# Patient Record
Sex: FEMALE | Race: WHITE | Employment: PART TIME | URBAN - METROPOLITAN AREA
[De-identification: names, ages, dates, MRNs, and addresses within clinical notes are randomized per-mention and may not be internally consistent; named-entity substitution may affect disease eponyms.]

---

## 2019-03-02 NOTE — PRE-PROCEDURE
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     Planned Procedure:  1. Right temporomandibular joint arthroscopy with lysis and lavage.     Planned Anesthesia: General via nasal endotracheal tube    Attending Surgeon:  Dr. Bello ANDRE     Resident Surgeon:  Dr. Gayathri ANDRE     Resident Assistants:  Dr. Raymon ANDRE.     Consent: Will be obtained day of procedure.     Last clinic encounter (12/10/18):    [facility] Progress Note    Interval Note: Bonnie is a 69 y/o woman who is referred from Dr. Pearl of the [facility] with history of right TMJ arthralgia at rest and with function.  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 Per patient, the arthralgia has been progressively worsening over the last 14 months despite physical therapy and wearing an occlusal splint. Patient reports to have a dull, aching pain of the right TMJ with a severity of 3/10 (with reports of discomfort up to 7/10 per progress notes) that occurs when clenching, yawning, and when eating chewy/crunchy foods. Reports symptoms of bilateral clicking and a history of closed lock on the right 1.5 years ago, this resolved spontaneously and has not occured again. The patient is unable to identify a specific time associated with the onset of these symptoms.   At her last visit on 11/9/18 SHAHZAD 32mm with deviation to right on opening, protrusive 8mm, right lateral 10mm, left lateral 12mm. Noted nontender popping of the left TMJ. No crepitus or popping noted at the right TMJ.    At last appointment, 11/9/18, surgical vs nonsurgical treatment  was discussed with the patient.  She expressed she would like nonsurgical treatment.  Recommended to patient NSAID therapy Ibuprofen 600mg q6h scheduled along with diet modification to minimize symptoms.  Patient presents today for re-evaluation of symptomatology associated with the right TMJ.    O:  HEENT: normocephalic, no facial swelliing. No CLAD.  SHAHZAD is 34mm with deviation to the right.  Popping of the left TMJ, no popping/clicking/crepitus of the right TMJ, it appears upon rotational component of opening she is never able to reduce the disc on the right inhibiting translation which could be reason for deviation to the right.  I/O: OP clear, uvula midline, fair oral hygiene, intact adult dentition. No vestibular edema, floor of mouth soft and not elevated. No erythema, ulcerations, pigmentations, swellings, or exophytic lesions present. SHAHZAD 34mm with noted deviation to right on opening, protrusive 8mm, right lateral 10mm, left lateral 12mm.     Imaging: None new    A:  Bonnie Elliott is a 69 yo woman, ASA 2, with history of right TMJ arthralgia and right anterior disc displacement without reduction    P:  - Dr. Monsalve evaluated patient and all available imaging  - Discussed with patient and her , Randell, risk of TMJ arthroscopy including bleeding, perforation into external auditory canal, perforation in middle cranial fossa, paresthesia of branches of V3, damage to CN VII, inability to eliminate pain  - Discussed benefits of TMJ arthroscopy.  Discussed that this procedure is diagnostic as well as therapeutic given that the scope allows visualization of the superior joint space which can identify fibrillations, inflammatory markings, and adhesions.  In addition arthroscopy will act to increase the volume of the joint space in the effort to promote increased mobility of the right TMJ post-operatively.  In addition will place steroid and opioid into joint space for relief.  - Will need order for PT  post-operatively  - Questions answered    NV: Right TMJ arthroscopy with Dr. Bello Trinidad DDS    Staff: Dr. Bello Henderson DDS   Oral & Maxillofacial Surgery  [phone]

## 2019-03-04 RX ORDER — FLUOXETINE 10 MG/1
10 CAPSULE ORAL DAILY
COMMUNITY

## 2019-03-04 RX ORDER — SODIUM PHOSPHATE,MONO-DIBASIC 19G-7G/118
1 ENEMA (ML) RECTAL DAILY
COMMUNITY

## 2019-03-04 RX ORDER — FAMOTIDINE 20 MG
TABLET ORAL
COMMUNITY

## 2019-03-04 RX ORDER — ASPIRIN 81 MG/1
81 TABLET, CHEWABLE ORAL DAILY
COMMUNITY

## 2019-03-04 SDOH — HEALTH STABILITY: MENTAL HEALTH: HOW OFTEN DO YOU HAVE A DRINK CONTAINING ALCOHOL?: NEVER

## 2019-03-05 ENCOUNTER — ANESTHESIA EVENT (OUTPATIENT)
Dept: SURGERY | Facility: CLINIC | Age: 69
End: 2019-03-05
Payer: COMMERCIAL

## 2019-03-06 ENCOUNTER — ANESTHESIA (OUTPATIENT)
Dept: SURGERY | Facility: CLINIC | Age: 69
End: 2019-03-06
Payer: COMMERCIAL

## 2019-03-06 ENCOUNTER — HOSPITAL ENCOUNTER (OUTPATIENT)
Facility: CLINIC | Age: 69
Discharge: HOME | End: 2019-03-06
Attending: ORAL & MAXILLOFACIAL SURGERY | Admitting: ORAL & MAXILLOFACIAL SURGERY
Payer: COMMERCIAL

## 2019-03-06 VITALS
OXYGEN SATURATION: 99 % | HEART RATE: 70 BPM | HEIGHT: 67 IN | RESPIRATION RATE: 18 BRPM | SYSTOLIC BLOOD PRESSURE: 130 MMHG | TEMPERATURE: 97.9 F | DIASTOLIC BLOOD PRESSURE: 69 MMHG | BODY MASS INDEX: 22.39 KG/M2 | WEIGHT: 142.64 LBS

## 2019-03-06 DIAGNOSIS — M26.621 ARTHRALGIA OF RIGHT TEMPOROMANDIBULAR JOINT: Primary | ICD-10-CM

## 2019-03-06 LAB — GLUCOSE BLDC GLUCOMTR-MCNC: 82 MG/DL (ref 70–99)

## 2019-03-06 PROCEDURE — 71000014 ZZH RECOVERY PHASE 1 LEVEL 2 FIRST HR: Performed by: ORAL & MAXILLOFACIAL SURGERY

## 2019-03-06 PROCEDURE — 82962 GLUCOSE BLOOD TEST: CPT

## 2019-03-06 PROCEDURE — 36000057 ZZH SURGERY LEVEL 3 1ST 30 MIN - UMMC: Performed by: ORAL & MAXILLOFACIAL SURGERY

## 2019-03-06 PROCEDURE — 25000132 ZZH RX MED GY IP 250 OP 250 PS 637: Performed by: DENTIST

## 2019-03-06 PROCEDURE — 25000125 ZZHC RX 250: Performed by: NURSE ANESTHETIST, CERTIFIED REGISTERED

## 2019-03-06 PROCEDURE — 36000059 ZZH SURGERY LEVEL 3 EA 15 ADDTL MIN UMMC: Performed by: ORAL & MAXILLOFACIAL SURGERY

## 2019-03-06 PROCEDURE — 25000128 H RX IP 250 OP 636: Performed by: DENTIST

## 2019-03-06 PROCEDURE — 37000008 ZZH ANESTHESIA TECHNICAL FEE, 1ST 30 MIN: Performed by: ORAL & MAXILLOFACIAL SURGERY

## 2019-03-06 PROCEDURE — 27210794 ZZH OR GENERAL SUPPLY STERILE: Performed by: ORAL & MAXILLOFACIAL SURGERY

## 2019-03-06 PROCEDURE — 40000170 ZZH STATISTIC PRE-PROCEDURE ASSESSMENT II: Performed by: ORAL & MAXILLOFACIAL SURGERY

## 2019-03-06 PROCEDURE — 25000565 ZZH ISOFLURANE, EA 15 MIN: Performed by: ORAL & MAXILLOFACIAL SURGERY

## 2019-03-06 PROCEDURE — 25800030 ZZH RX IP 258 OP 636: Performed by: NURSE ANESTHETIST, CERTIFIED REGISTERED

## 2019-03-06 PROCEDURE — 25000128 H RX IP 250 OP 636: Performed by: NURSE ANESTHETIST, CERTIFIED REGISTERED

## 2019-03-06 PROCEDURE — 25000128 H RX IP 250 OP 636: Performed by: ORAL & MAXILLOFACIAL SURGERY

## 2019-03-06 PROCEDURE — 71000027 ZZH RECOVERY PHASE 2 EACH 15 MINS: Performed by: ORAL & MAXILLOFACIAL SURGERY

## 2019-03-06 PROCEDURE — 25800025 ZZH RX 258: Performed by: ORAL & MAXILLOFACIAL SURGERY

## 2019-03-06 PROCEDURE — 37000009 ZZH ANESTHESIA TECHNICAL FEE, EACH ADDTL 15 MIN: Performed by: ORAL & MAXILLOFACIAL SURGERY

## 2019-03-06 RX ORDER — BUPIVACAINE HYDROCHLORIDE 2.5 MG/ML
INJECTION, SOLUTION INFILTRATION; PERINEURAL PRN
Status: DISCONTINUED | OUTPATIENT
Start: 2019-03-06 | End: 2019-03-06 | Stop reason: HOSPADM

## 2019-03-06 RX ORDER — DEXAMETHASONE SODIUM PHOSPHATE 10 MG/ML
INJECTION, SOLUTION INTRAMUSCULAR; INTRAVENOUS PRN
Status: DISCONTINUED | OUTPATIENT
Start: 2019-03-06 | End: 2019-03-06 | Stop reason: HOSPADM

## 2019-03-06 RX ORDER — KETOROLAC TROMETHAMINE 30 MG/ML
INJECTION, SOLUTION INTRAMUSCULAR; INTRAVENOUS PRN
Status: DISCONTINUED | OUTPATIENT
Start: 2019-03-06 | End: 2019-03-06

## 2019-03-06 RX ORDER — PROPOFOL 10 MG/ML
INJECTION, EMULSION INTRAVENOUS PRN
Status: DISCONTINUED | OUTPATIENT
Start: 2019-03-06 | End: 2019-03-06

## 2019-03-06 RX ORDER — NALOXONE HYDROCHLORIDE 0.4 MG/ML
.1-.4 INJECTION, SOLUTION INTRAMUSCULAR; INTRAVENOUS; SUBCUTANEOUS
Status: DISCONTINUED | OUTPATIENT
Start: 2019-03-06 | End: 2019-03-06 | Stop reason: HOSPADM

## 2019-03-06 RX ORDER — CEFAZOLIN SODIUM 1 G/3ML
1 INJECTION, POWDER, FOR SOLUTION INTRAMUSCULAR; INTRAVENOUS SEE ADMIN INSTRUCTIONS
Status: DISCONTINUED | OUTPATIENT
Start: 2019-03-06 | End: 2019-03-06 | Stop reason: HOSPADM

## 2019-03-06 RX ORDER — ONDANSETRON 2 MG/ML
4 INJECTION INTRAMUSCULAR; INTRAVENOUS EVERY 30 MIN PRN
Status: DISCONTINUED | OUTPATIENT
Start: 2019-03-06 | End: 2019-03-06 | Stop reason: HOSPADM

## 2019-03-06 RX ORDER — FENTANYL CITRATE 50 UG/ML
INJECTION, SOLUTION INTRAMUSCULAR; INTRAVENOUS PRN
Status: DISCONTINUED | OUTPATIENT
Start: 2019-03-06 | End: 2019-03-06 | Stop reason: HOSPADM

## 2019-03-06 RX ORDER — MEPERIDINE HYDROCHLORIDE 50 MG/ML
12.5 INJECTION INTRAMUSCULAR; INTRAVENOUS; SUBCUTANEOUS
Status: DISCONTINUED | OUTPATIENT
Start: 2019-03-06 | End: 2019-03-06 | Stop reason: HOSPADM

## 2019-03-06 RX ORDER — CEFAZOLIN SODIUM 2 G/100ML
2 INJECTION, SOLUTION INTRAVENOUS
Status: COMPLETED | OUTPATIENT
Start: 2019-03-06 | End: 2019-03-06

## 2019-03-06 RX ORDER — FENTANYL CITRATE 50 UG/ML
25-50 INJECTION, SOLUTION INTRAMUSCULAR; INTRAVENOUS
Status: DISCONTINUED | OUTPATIENT
Start: 2019-03-06 | End: 2019-03-06 | Stop reason: HOSPADM

## 2019-03-06 RX ORDER — ACETAMINOPHEN 325 MG/1
650 TABLET ORAL EVERY 4 HOURS PRN
Status: DISCONTINUED | OUTPATIENT
Start: 2019-03-06 | End: 2019-03-06 | Stop reason: HOSPADM

## 2019-03-06 RX ORDER — ACETAMINOPHEN 325 MG/1
650 TABLET ORAL EVERY 4 HOURS PRN
Qty: 50 TABLET | Refills: 0 | Status: SHIPPED | OUTPATIENT
Start: 2019-03-06

## 2019-03-06 RX ORDER — SODIUM CHLORIDE, SODIUM LACTATE, POTASSIUM CHLORIDE, CALCIUM CHLORIDE 600; 310; 30; 20 MG/100ML; MG/100ML; MG/100ML; MG/100ML
INJECTION, SOLUTION INTRAVENOUS CONTINUOUS
Status: DISCONTINUED | OUTPATIENT
Start: 2019-03-06 | End: 2019-03-06 | Stop reason: HOSPADM

## 2019-03-06 RX ORDER — ONDANSETRON 4 MG/1
4 TABLET, ORALLY DISINTEGRATING ORAL EVERY 30 MIN PRN
Status: DISCONTINUED | OUTPATIENT
Start: 2019-03-06 | End: 2019-03-06 | Stop reason: HOSPADM

## 2019-03-06 RX ORDER — FENTANYL CITRATE 50 UG/ML
INJECTION, SOLUTION INTRAMUSCULAR; INTRAVENOUS PRN
Status: DISCONTINUED | OUTPATIENT
Start: 2019-03-06 | End: 2019-03-06

## 2019-03-06 RX ORDER — CHLORHEXIDINE GLUCONATE ORAL RINSE 1.2 MG/ML
10 SOLUTION DENTAL ONCE
Status: COMPLETED | OUTPATIENT
Start: 2019-03-06 | End: 2019-03-06

## 2019-03-06 RX ORDER — DIMENHYDRINATE 50 MG/ML
25 INJECTION, SOLUTION INTRAMUSCULAR; INTRAVENOUS
Status: DISCONTINUED | OUTPATIENT
Start: 2019-03-06 | End: 2019-03-06 | Stop reason: HOSPADM

## 2019-03-06 RX ORDER — HYDROCODONE BITARTRATE AND ACETAMINOPHEN 5; 325 MG/1; MG/1
1-2 TABLET ORAL EVERY 4 HOURS PRN
Qty: 8 TABLET | Refills: 0 | Status: SHIPPED | OUTPATIENT
Start: 2019-03-06 | End: 2019-03-09

## 2019-03-06 RX ORDER — LIDOCAINE HYDROCHLORIDE 20 MG/ML
INJECTION, SOLUTION INFILTRATION; PERINEURAL PRN
Status: DISCONTINUED | OUTPATIENT
Start: 2019-03-06 | End: 2019-03-06

## 2019-03-06 RX ORDER — SODIUM CHLORIDE, SODIUM LACTATE, POTASSIUM CHLORIDE, CALCIUM CHLORIDE 600; 310; 30; 20 MG/100ML; MG/100ML; MG/100ML; MG/100ML
INJECTION, SOLUTION INTRAVENOUS CONTINUOUS PRN
Status: DISCONTINUED | OUTPATIENT
Start: 2019-03-06 | End: 2019-03-06

## 2019-03-06 RX ORDER — FENTANYL CITRATE 50 UG/ML
50 INJECTION, SOLUTION INTRAMUSCULAR; INTRAVENOUS ONCE
Status: DISCONTINUED | OUTPATIENT
Start: 2019-03-06 | End: 2019-03-06 | Stop reason: HOSPADM

## 2019-03-06 RX ORDER — IBUPROFEN 600 MG/1
600 TABLET, FILM COATED ORAL EVERY 6 HOURS PRN
Qty: 20 TABLET | Refills: 1 | Status: SHIPPED | OUTPATIENT
Start: 2019-03-06 | End: 2019-03-11

## 2019-03-06 RX ORDER — ONDANSETRON 2 MG/ML
INJECTION INTRAMUSCULAR; INTRAVENOUS PRN
Status: DISCONTINUED | OUTPATIENT
Start: 2019-03-06 | End: 2019-03-06

## 2019-03-06 RX ORDER — DEXAMETHASONE SODIUM PHOSPHATE 4 MG/ML
INJECTION, SOLUTION INTRA-ARTICULAR; INTRALESIONAL; INTRAMUSCULAR; INTRAVENOUS; SOFT TISSUE PRN
Status: DISCONTINUED | OUTPATIENT
Start: 2019-03-06 | End: 2019-03-06

## 2019-03-06 RX ADMIN — FENTANYL CITRATE 50 MCG: 50 INJECTION, SOLUTION INTRAMUSCULAR; INTRAVENOUS at 08:28

## 2019-03-06 RX ADMIN — CHLORHEXIDINE GLUCONATE 10 ML: 1.2 RINSE ORAL at 07:13

## 2019-03-06 RX ADMIN — ONDANSETRON 4 MG: 2 INJECTION INTRAMUSCULAR; INTRAVENOUS at 08:00

## 2019-03-06 RX ADMIN — FENTANYL CITRATE 50 MCG: 50 INJECTION, SOLUTION INTRAMUSCULAR; INTRAVENOUS at 08:00

## 2019-03-06 RX ADMIN — DEXAMETHASONE SODIUM PHOSPHATE 6 MG: 4 INJECTION, SOLUTION INTRA-ARTICULAR; INTRALESIONAL; INTRAMUSCULAR; INTRAVENOUS; SOFT TISSUE at 08:00

## 2019-03-06 RX ADMIN — CEFAZOLIN SODIUM 2 G: 2 INJECTION, SOLUTION INTRAVENOUS at 08:00

## 2019-03-06 RX ADMIN — MIDAZOLAM 1 MG: 1 INJECTION INTRAMUSCULAR; INTRAVENOUS at 07:30

## 2019-03-06 RX ADMIN — ROCURONIUM BROMIDE 35 MG: 10 INJECTION INTRAVENOUS at 07:45

## 2019-03-06 RX ADMIN — FENTANYL CITRATE 50 MCG: 50 INJECTION, SOLUTION INTRAMUSCULAR; INTRAVENOUS at 08:08

## 2019-03-06 RX ADMIN — MIDAZOLAM 1 MG: 1 INJECTION INTRAMUSCULAR; INTRAVENOUS at 07:39

## 2019-03-06 RX ADMIN — PROPOFOL 140 MG: 10 INJECTION, EMULSION INTRAVENOUS at 07:45

## 2019-03-06 RX ADMIN — SODIUM CHLORIDE, POTASSIUM CHLORIDE, SODIUM LACTATE AND CALCIUM CHLORIDE: 600; 310; 30; 20 INJECTION, SOLUTION INTRAVENOUS at 07:39

## 2019-03-06 RX ADMIN — FENTANYL CITRATE 50 MCG: 50 INJECTION, SOLUTION INTRAMUSCULAR; INTRAVENOUS at 07:45

## 2019-03-06 RX ADMIN — KETOROLAC TROMETHAMINE 30 MG: 30 INJECTION, SOLUTION INTRAMUSCULAR at 08:49

## 2019-03-06 RX ADMIN — SODIUM CHLORIDE, POTASSIUM CHLORIDE, SODIUM LACTATE AND CALCIUM CHLORIDE: 600; 310; 30; 20 INJECTION, SOLUTION INTRAVENOUS at 09:04

## 2019-03-06 RX ADMIN — SUGAMMADEX 200 MG: 100 INJECTION, SOLUTION INTRAVENOUS at 08:55

## 2019-03-06 RX ADMIN — ROCURONIUM BROMIDE 15 MG: 10 INJECTION INTRAVENOUS at 08:28

## 2019-03-06 RX ADMIN — LIDOCAINE HYDROCHLORIDE 80 MG: 20 INJECTION, SOLUTION INFILTRATION; PERINEURAL at 07:45

## 2019-03-06 ASSESSMENT — MIFFLIN-ST. JEOR: SCORE: 1219.63

## 2019-03-06 NOTE — BRIEF OP NOTE
[facility], [place]    Brief Operative Note    Pre-operative diagnosis: Right Tempormandivular Joint Arthralgia, Disc Displacement without reduction, Osteoarthritis, Anxiety  Post-operative diagnosis Right Tempormandivular Joint Arthralgia, Disc Displacement without reduction, Osteoarthritis, Anxiety  Procedure: Procedure(s):  Right Tempormandibular Joint Arthroscopy with lysis and lavage  Surgeon: Surgeon(s) and Role:     * Rebel Monsalve DDS - Primary     * Barbara Trinh DDS - Resident - Assisting     * Gerald Weinberg MD - Resident - Assisting  Anesthesia: General   Estimated blood loss: 2 ml  Fluids: 1000 ml LR  Anesthetic. 2 ml 0.5% Marcaine no epinephrine  Drains: None  Specimens: * No specimens in log *  Findings:   extensive fibrillations in right TMJ.  Complications: None.  Implants: None.

## 2019-03-06 NOTE — OR NURSING
Oral surgery resident LAXMI REINA - stated pt did not need to urinate before discharge home.   Discharge instructions to pt and responsible adult.  All questions regarding discharge information answered.  Pt and responsible adult verbalized understanding of all discharge instruction information given.

## 2019-03-06 NOTE — ANESTHESIA POSTPROCEDURE EVALUATION
Anesthesia POST Procedure Evaluation    Patient: Bonnie Elliott   MRN:     [id number] Gender:   female   Age:    68 year old :      1950        Preoperative Diagnosis: Right Tempormandivular Joint Arthralgia, Disc Displacement without reduction, Osteoarthritis, Anxiety   Procedure(s):  Right Tempormandibular Joint Arthroscopy with lysis and lavage   Postop Comments: No value filed.       Anesthesia Type:  General    Reportable Event: NO     PAIN: Uncomplicated   Sign Out status: Comfortable, Well controlled pain     PONV: No PONV   Sign Out status:  No Nausea or Vomiting     Neuro/Psych: Uneventful perioperative course   Sign Out Status: Preoperative baseline; Age appropriate mentation     Airway/Resp.: Uneventful perioperative course   Sign Out Status: Non labored breathing, age appropriate RR; Resp. Status within EXPECTED Parameters     CV: Uneventful perioperative course   Sign Out status: Appropriate BP and perfusion indices; Appropriate HR/Rhythm     Disposition:   Sign Out in:  PACU  Disposition:  Phase II; Home  Recovery Course: Uneventful  Follow-Up: Not required           Last Anesthesia Record Vitals:  CRNA VITALS  3/6/2019 0833 - 3/6/2019 0933      3/6/2019             Pulse:  99    SpO2:  100 %    Resp Rate (observed):  3  (Abnormal)           Last PACU/Preop Vitals:  Vitals:    19 0945 19 1000 19 1018   BP: 132/67 122/67 134/68   Pulse:  63 64   Resp: 12 12 12   Temp:  36.4  C (97.5  F) 36.4  C (97.5  F)   SpO2: 100% 100% 97%         Electronically Signed By: Guadalupe Castellon MD, 2019, 10:41 AM

## 2019-03-06 NOTE — ANESTHESIA PREPROCEDURE EVALUATION
Anesthesia Pre-Procedure Evaluation    Patient: Bonnie Elliott   MRN:     [id number] Gender:   female   Age:    68 year old :      1950        Preoperative Diagnosis: Right Tempormandivular Joint Arthralgia, Disc Displacement without reduction, Osteoarthritis, Anxiety   Procedure(s):  Right Tempormandibular Joint Arthroscopy **Latex Allergy**     Past Medical History:   Diagnosis Date     Anemia      Anxious depression      Endometriosis       Past Surgical History:   Procedure Laterality Date     AS REMOVAL OF OVARY(S)       COLONOSCOPY       GYN SURGERY            Anesthesia Evaluation     . Pt has had prior anesthetic. Type: General and MAC           ROS/MED HX    ENT/Pulmonary:  - neg pulmonary ROS     Neurologic:  - neg neurologic ROS     Cardiovascular:  - neg cardiovascular ROS       METS/Exercise Tolerance:  >4 METS   Hematologic:         Musculoskeletal:   (+) , , other musculoskeletal (TMJ right associated with feeling of sore throat and right neck discomfort.  Pt can open mouth, but force of chewing is painful)-       GI/Hepatic:  - neg GI/hepatic ROS       Renal/Genitourinary:  - ROS Renal section negative       Endo:  - neg endo ROS       Psychiatric:     (+) psychiatric history anxiety      Infectious Disease:  - neg infectious disease ROS       Malignancy:      - no malignancy   Other:                         PHYSICAL EXAM:   Mental Status/Neuro: A/A/O   Airway: Facies: Feasible  Mallampati: IV  Mouth/Opening: Limited  TM distance: < 6 cm  Neck ROM: Full   Respiratory: Auscultation: CTAB     Resp. Rate: Normal     Resp. Effort: Normal      CV: Rhythm: Regular  Rate: Age appropriate  Heart: Normal Sounds   Comments:      Dental: Normal                  No results found for: WBC, HGB, HCT, PLT, CRP, SED, NA, POTASSIUM, CHLORIDE, CO2, BUN, CR, GLC, RACHAEL, PHOS, MAG, ALBUMIN, PROTTOTAL, ALT, AST, GGT, ALKPHOS, BILITOTAL, BILIDIRECT, LIPASE, AMYLASE, KATHY, PTT, INR, FIBR, TSH, T4, T3, HCG,  "HCGS, CKTOTAL, CKMB, TROPN    Preop Vitals  BP Readings from Last 3 Encounters:   03/06/19 131/70    Pulse Readings from Last 3 Encounters:   03/06/19 78      Resp Readings from Last 3 Encounters:   03/06/19 16    SpO2 Readings from Last 3 Encounters:   03/06/19 99%      Temp Readings from Last 1 Encounters:   03/06/19 36.7  C (98.1  F) (Oral)    Ht Readings from Last 1 Encounters:   03/06/19 1.702 m (5' 7\")      Wt Readings from Last 1 Encounters:   03/06/19 64.7 kg (142 lb 10.2 oz)    Estimated body mass index is 22.34 kg/m  as calculated from the following:    Height as of this encounter: 1.702 m (5' 7\").    Weight as of this encounter: 64.7 kg (142 lb 10.2 oz).     LDA:            Assessment:   ASA SCORE: 2    NPO Status: > 2 hours since completed Clear Liquids; > 6 hours since completed Solid Foods   Documentation: H&P complete; Preop Testing complete; Consents complete   Proceeding: Proceed without further delay  Tobacco Use:  NO Active use of Tobacco/UNKNOWN Tobacco use status     Plan:   Anes. Type:  General   Pre-Induction: Midazolam IV; Acetaminophen PO   Induction:  IV (Standard)   Airway: Oral ETT   Access/Monitoring: PIV   Maintenance: Balanced   Emergence: Procedure Site   Logistics: Same Day Surgery     Postop Pain/Sedation Strategy:  Standard-Options: Opioids PRN     PONV Management:  Adult Risk Factors: Female, Non-Smoker, Postop Opioids  Prevention: Ondansetron; Dexamethasone     CONSENT: Direct conversation   Plan and risks discussed with: Patient; Sibling   Blood Products: Consent Deferred (Minimal Blood Loss)                         Guadalupe Castellon MD  "

## 2019-03-06 NOTE — DISCHARGE INSTRUCTIONS
What can I expect after surgery?    Following surgery, you will spend a brief period of time in the recovery room. Once your have recovered from the anesthesia, you will be discharged with the appropriate prescriptions and home care instructions below. You must arrive with a responsible adult who will drive you home and care for you after the surgery.    Swelling is normal after surgery and may take 5-7 days to improve.    You may experience a temporary change in your bite. If so, this will  usually resolve in 5-7 days.    You may notice clicking in your jaw joint that was not present before.  This may be normal and is not a cause for concern.    You may have numbness and possible temporary paralysis of the face on  the side of the surgery. This is usually caused by local anesthetic (numbing medication) administered for your comfort and should only last for a few hours.  What are my post-operative instructions?    Apply ice packs to the affected side of the face for the first 12-24 hours.    While in bed, keep your head elevated with 2-3 pillows to minimize  swelling.    Resorbable sutures (aka. Stitches) are placed over your incisions. These  stitches will dissolve so there is no need to remove any stitches.    You may shower anytime. To dry, lightly pat your face with a towel  instead of rubbing it.    After the first 48 hours, you may apply warm heat (ie. heating pads or  microwave a wet cloth) to the jaw muscles and temples as needed for  Comfort.    What should my activity level be?    Avoid any strenuous physical activity for 1 week.    You may return to school or light duty work (non-physical labor) within a  week of the surgery or as tolerated per your symptoms.    What should my activity level be?  Begin jaw-stretching exercises as soon as possible after surgery:  1.Manual jaw stretch: Pry the upper and lower teeth apart using your index finger on the lower teeth and your thumb on the upper teeth. Scissor  or spread the teeth apart to stretch your jaw until you feel some tightness and hold it for 2-3 seconds. Repeat this maneuver 20 times in the morning, afternoon, and evening, for a total of 60 times daily.  2. It may help to apply warm heat over your jaw muscles before and after these stretching exercises.  3. Since these jaw-stretching exercises are likely to cause some discomfort, you may take analgesics (pain killers) one hour prior.    Are there diet restrictions after surgery?    A soft diet is recommended for 1-2 weeks after your surgery (e.g. yogurt, eggs, mashed potatoes, overcooked pasta, etc.).    Avoid hard, crunchy or chewy foods for 1-2 weeks.    Drink plenty of water and other liquids to stay well hydrated.    How do I manage pain after surgery?  Your doctors will prescribe analgesics (pain killers) based on your health history and current medications. Please take them as prescribed. The most common medications prescribed after TMJ arthroscopy include:    Anti-inflammatory medications     Muscle relaxants  Note: Our goal is to tailor the treatment to your specific needs. Your doctor may prescribe other medications as needed.    What follow-up care will I receive?  Your doctor s office will call you to schedule a follow-up appointment. Usually this appointment will take place 2-3 weeks after surgery.    When should I call my doctor?    If you have difficulty breathing    If you have increased swelling 2-3 days after surgery    If you have difficulty closing your eyes after surgery    If you are unable to urinate    If you have severe pain that is not relieved by medications    If you experience a rash, nausea, vomiting, severe headache, severe  constipation, or other unexpected reactions    If you have an oral temperature over 100.5 degrees.    If you have a question or concern that must be addressed prior to the  follow-up visit.    Who should I call if I have questions?  If  you have any questions  please feel free to call our clinic or page the oral surgery resident on call.  [phone] - Ask for the Oral Surgery Resident on Call     [facility], [place]  Same-Day Surgery   Adult Discharge Orders & Instructions     For 24 hours after surgery    1. Get plenty of rest.  A responsible adult must stay with you for at least 24 hours after you leave the hospital.   2. Do not drive or use heavy equipment.  If you have weakness or tingling, don't drive or use heavy equipment until this feeling goes away.  3. Do not drink alcohol.  4. Avoid strenuous or risky activities.  Ask for help when climbing stairs.   5. You may feel lightheaded.  IF so, sit for a few minutes before standing.  Have someone help you get up.   6. If you have nausea (feel sick to your stomach): Drink only clear liquids such as apple juice, ginger ale, broth or 7-Up.  Rest may also help.  Be sure to drink enough fluids.  Move to a regular diet as you feel able.  7. You may have a slight fever. Call the doctor if your fever is over 100 F (37.7 C) (taken under the tongue) or lasts longer than 24 hours.  8. You may have a dry mouth, a sore throat, muscle aches or trouble sleeping.  These should go away after 24 hours.  9. Do not make important or legal decisions.   Call your doctor for any of the followin.  Signs of infection (fever, growing tenderness at the surgery site, a large amount of drainage or bleeding, severe pain, foul-smelling drainage, redness, swelling).    2. It has been over 8 to 10 hours since surgery and you are still not able to urinate (pass water).    3.  Headache for over 24 hours.    4.  Numbness, tingling or weakness the day after surgery (if you had spinal anesthesia).  Use this number to contact the clinic during regular business hours only please.  To contact a doctor, call __Dr. Monsalve (Oral Surgery) @ [phone]____ or:       Use this number if the clinic is closed; this is a [facility]  answering service [phone] and ask for the resident on call for   _(Oral Surgery) @ [phone]___ (answered 24 hours a day)      Emergency Department:    [facility]: [phone]       (TTY for hearing impaired: [phone])    [facility]: [phone]       (TTY for hearing impaired: [phone])           Tips for taking pain medications  To get the best pain relief possible , remember these points:      Take pain medications as directed, before pain becomes severe      Pain medication can upset your stomach: taking it with food may help      Constipation is a common side effect of pain medication. Drink plenty of  Fluids      Eat foods high in fiber. Take a stool softener  if recommended by your doctor or  Pharmacist.        Do not drink alcohol, drive or operate machinery while taking pain medications.      Ask about other ways to control pain, such as with heat, ice or relaxation.

## 2019-03-06 NOTE — ANESTHESIA CARE TRANSFER NOTE
Patient: Bonnie Elliott    Procedure(s):  Right Tempormandibular Joint Arthroscopy with lysis and lavage    Diagnosis: Right Tempormandivular Joint Arthralgia, Disc Displacement without reduction, Osteoarthritis, Anxiety  Diagnosis Additional Information: No value filed.    Anesthesia Type:   No value filed.     Note:  Airway :Face Mask  Patient transferred to:PACU  Handoff Report: Identifed the Patient, Identified the Reponsible Provider, Reviewed the pertinent medical history, Discussed the surgical course, Reviewed Intra-OP anesthesia mangement and issues during anesthesia, Set expectations for post-procedure period and Allowed opportunity for questions and acknowledgement of understanding      Vitals: (Last set prior to Anesthesia Care Transfer)    CRNA VITALS  3/6/2019 0833 - 3/6/2019 0907      3/6/2019             Pulse:  99    SpO2:  100 %    Resp Rate (observed):  3  (Abnormal)                 Electronically Signed By: JOHNSON Reyes CRNA  March 6, 2019  9:07 AM

## 2019-03-07 NOTE — OP NOTE
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 Monsalve and the Oral Maxillofacial Surgery Service for evaluation of right TMJ arthralgia and anterior disc displacement without reduction. She has pursued conservative management with Dr. Rodriguez including occlusal splint and physical therapy which did not improve her pain. Her SHAHZAD is 32mm.  Review of TMJ MRI obtained on 10/8/18  which showed right sided stage V internal derangement. She met with Dr. Monsalve who discussed continue conservative therapies vs arthroscopy with lysis and lavage. She decided to proceed with arthroscopy with lysis and lavage.       The patient elected to proceed with right TMJ arthroscopic procedure.  The risks of the procedure including pain, swelling, bleeding, infection, damage to adjacent structures including temporary or permanent paresthesia, temporary or permanent paralysis of the facial nerve, injury to the middle cranial fossa, injury to the middle ear with temporary or permanent hearing loss, or the potential for need for further procedures.  The patient verbalized understanding of the risks of the procedure and was scheduled for the procedure.      DESCRIPTION OF PROCEDURE:     The patient was met in the pre-operative holding unit at the [facility]. A head wrap was placed by the Oral Surgery Service followed by site marking of the right TMJ.  The patient was then turned over to the Anesthesia Service. Informed consent  was signed. The patient was transported via gurney to operating room #8 and once in the operating room was moved onto the operating room table and placed in the supine position.  The patient was appropriately tucked and padded. The patient then underwent a IV induction and an oral endotracheal tube was placed.  Proper positioning of the endotracheal tube was verified with positive end tidal CO2 as well as bilateral breath sounds to auscultation. The oral endotracheal tube was secured by the Anesthesia service. Brisement procedure was completed and patient had an SHAHZAD of 38 mm. There was noted to have unrestricted movement in the right TMJ. The facial prep was then performed by the Oral Surgery Service with placement of benzoin and tape surrounding the right and left ear in order to retract hair from the surgical field.  A Betadine facial scrub was then performed in order to obtain a sterile operating field.  The surgeons left the room to sterilely scrub and returned to don sterile gown and gloves.  Sterile towels and sterile drapes were then placed in order to obtain a sterile operating field.  An aperture drape was placed over the right and left ear and preauricular region in preparation for the surgical procedure.  A time out was then performed following the [facility] protocol in order to ensure proper patient and procedure.      Attention directed to the right temporomandibular joint. An 18-gauge needle on a finger controlled syringe was used to enter the superior joint space of the right temporomandibular joint in anterior-superior-medial direction. 2 mL of 0.25% Marcaine without epinephrine was injected into the superior joint space of the right temporomandibular joint for analgesia as well as for insufflation, rebound was visualized in syringe and could be palpated in the mandible. An additional 2cc of heparinized LR was used to further insufflated the superior TMJ space.   The syringe and needle were removed from the right superior joint space and the sharp trocar with the arthroscope cannula was introduced into the same puncture wound with the same orientation made by the 18-gauge needle.  The sharp trocar was used to advance the cannula of the arthroscope into the right superior joint space.  Once in the superior joint space, the sharp trocar was removed, back flow noted.  Heparinized lactated ringer's was used to lavage the superior joint space in order to remove heme and debris prior to visualization with the arthroscope.  The arthroscope was placed into the cannula. Some fibrillations were noted as well as vascular marking were noted along the articular disc and retrodiscal tissue. There was a depression in the disc but perforation of the disc could not be confirmed. Intraoperative photographs were obtained after examination of the joint space. Lavage was continued. The arthroscope was removed from the cannula and the blunt trocar was placed in the cannula for lysis of adhesions.   Additional lavage was performed with heparinized lactated Ringer's in order to remove debris from the superior joint space of the right temporomandibular joint.  480 mL of heparinized lactated Ringer's was used throughout the surgical procedure in order to lavage the superior joint space of the right temporomandibular joint.  Following completion of the lavage, 50mcg Fentanyl and 4mg of Decadron were administered into the superior joint space.  Following administration of medications, the arthroscope trocar was removed from the right temporomandibular superior joint space with fluid back pressure.  A 4x4 gauze was held with pressure over the trocar insertion site for approximately 5 minutes in order to decrease postoperative edema as well as to aid in postoperative hemostasis. The surgical site was cleaned with a moist gauze and dried. Bandaid was placed over the trochar site bilaterally. Counts were  noted to be correct x 2.      The patient was then turned over to the Anesthesia Service for a smooth emergence from anesthesia and extubation. The patient was transported by the Anesthesia Service to the Postanesthesia Care Unit for postoperative monitoring and pain management. The patient was discharged to home with postoperative instructions, medications and follow-up appointment information.  Patient has post-operative physical therapy ordered.    Dr. Monsalve was scrubbed entire procedure.       ESTIMATED BLOOD LOSS:  2 mL.      FLUIDS:  1000ml crystalloid      SPECIMENS:  None.       COMPLICATIONS:  None.       FINDINGS:  480 mL of heparinized lactated Ringer's was lavaged in the right temporomandibular joint space. Right temporomandibular joint was noted to have some fibrillations and vascular markings suggestive of inflammation.      Kalpana Trinh DDS

## (undated) DEVICE — SYR 50ML LL W/O NDL 309653

## (undated) DEVICE — NDL BLUNT 18GA 1" W/O FILTER 305181

## (undated) DEVICE — SYR 10ML LL W/O NDL 302995

## (undated) DEVICE — TUBING PRESSURE W/MALE TO FEMALE CONNECTOR 24" 50P124

## (undated) DEVICE — LABEL MEDICATION SYSTEM 3303-P

## (undated) DEVICE — LINEN TOWEL PACK X5 5464

## (undated) DEVICE — DRAPE POUCH IRR 1016

## (undated) DEVICE — DRSG JAWBRA  95

## (undated) DEVICE — TUBING PRESSURE MONITOR M/F CONNECTOR 48" 50P148

## (undated) DEVICE — CONNECTOR STOPCOCK 3 WAY MALE LL HI-FLO MX9311L

## (undated) DEVICE — PACK SET-UP STD 9102

## (undated) DEVICE — SOL RINGERS LACTATED 1000ML BAG 07953-09

## (undated) DEVICE — PREP SKIN SCRUB TRAY 4461A

## (undated) DEVICE — BRUSH SURGICAL EZ SCRUB PLAIN 371603

## (undated) DEVICE — GOWN LG DISP 9515

## (undated) DEVICE — PREP POVIDONE IODINE SCRUB 7.5% 4OZ APL82212

## (undated) DEVICE — PACK GOWN 3/PK DISP XL SBA32GPFCB

## (undated) DEVICE — DRAPE STERI APERATURE 51X33" 1030

## (undated) DEVICE — DRSG COTTON BALL 6PK LCB62

## (undated) DEVICE — ADH LIQUID MASTISOL TOPICAL VIAL 2-3ML 0523-48

## (undated) DEVICE — BLADE CLIPPER SGL USE 9680

## (undated) DEVICE — DRSG BANDAID 1X3" FABRIC CURITY LATEX FREE KC44101

## (undated) DEVICE — TUBING IV 69" STERILE 1C8160S

## (undated) DEVICE — PREP POVIDONE IODINE SOLUTION 10% 4OZ

## (undated) DEVICE — NDL COUNTER 20CT 31142493

## (undated) DEVICE — SYR 10ML FINGER CONTROL W/O NDL 309695

## (undated) DEVICE — GLOVE PROTEXIS POWDER FREE SMT 7.5  2D72PT75X

## (undated) DEVICE — DRAPE U SPLIT 74X120" 29440

## (undated) DEVICE — TAPE CLOTH 2" CARDINAL 3TRCL02

## (undated) DEVICE — LINEN TOWEL PACK X6 WHITE 5487

## (undated) DEVICE — SYR 03ML LL W/O NDL 309657

## (undated) RX ORDER — HEPARIN SODIUM 1000 [USP'U]/ML
INJECTION, SOLUTION INTRAVENOUS; SUBCUTANEOUS
Status: DISPENSED
Start: 2019-03-06

## (undated) RX ORDER — BUPIVACAINE HYDROCHLORIDE 2.5 MG/ML
INJECTION, SOLUTION EPIDURAL; INFILTRATION; INTRACAUDAL
Status: DISPENSED
Start: 2019-03-06

## (undated) RX ORDER — FENTANYL CITRATE 50 UG/ML
INJECTION, SOLUTION INTRAMUSCULAR; INTRAVENOUS
Status: DISPENSED
Start: 2019-03-06

## (undated) RX ORDER — CHLORHEXIDINE GLUCONATE ORAL RINSE 1.2 MG/ML
SOLUTION DENTAL
Status: DISPENSED
Start: 2019-03-06

## (undated) RX ORDER — DEXAMETHASONE SODIUM PHOSPHATE 4 MG/ML
INJECTION, SOLUTION INTRA-ARTICULAR; INTRALESIONAL; INTRAMUSCULAR; INTRAVENOUS; SOFT TISSUE
Status: DISPENSED
Start: 2019-03-06

## (undated) RX ORDER — ONDANSETRON 2 MG/ML
INJECTION INTRAMUSCULAR; INTRAVENOUS
Status: DISPENSED
Start: 2019-03-06

## (undated) RX ORDER — EPHEDRINE SULFATE 50 MG/ML
INJECTION, SOLUTION INTRAMUSCULAR; INTRAVENOUS; SUBCUTANEOUS
Status: DISPENSED
Start: 2019-03-06

## (undated) RX ORDER — PHENYLEPHRINE HCL IN 0.9% NACL 1 MG/10 ML
SYRINGE (ML) INTRAVENOUS
Status: DISPENSED
Start: 2019-03-06

## (undated) RX ORDER — PROPOFOL 10 MG/ML
INJECTION, EMULSION INTRAVENOUS
Status: DISPENSED
Start: 2019-03-06

## (undated) RX ORDER — KETOROLAC TROMETHAMINE 30 MG/ML
INJECTION, SOLUTION INTRAMUSCULAR; INTRAVENOUS
Status: DISPENSED
Start: 2019-03-06

## (undated) RX ORDER — LIDOCAINE HYDROCHLORIDE 20 MG/ML
INJECTION, SOLUTION EPIDURAL; INFILTRATION; INTRACAUDAL; PERINEURAL
Status: DISPENSED
Start: 2019-03-06

## (undated) RX ORDER — CEFAZOLIN SODIUM 2 G/100ML
INJECTION, SOLUTION INTRAVENOUS
Status: DISPENSED
Start: 2019-03-06